# Patient Record
Sex: MALE | Race: WHITE | NOT HISPANIC OR LATINO | URBAN - METROPOLITAN AREA
[De-identification: names, ages, dates, MRNs, and addresses within clinical notes are randomized per-mention and may not be internally consistent; named-entity substitution may affect disease eponyms.]

---

## 2017-02-14 ENCOUNTER — FOLLOW UP ESTABLISHED (OUTPATIENT)
Dept: URBAN - METROPOLITAN AREA CLINIC 44 | Facility: CLINIC | Age: 62
End: 2017-02-14
Payer: COMMERCIAL

## 2017-02-14 PROCEDURE — 92083 EXTENDED VISUAL FIELD XM: CPT | Performed by: OPHTHALMOLOGY

## 2017-02-14 PROCEDURE — 92012 INTRM OPH EXAM EST PATIENT: CPT | Performed by: OPHTHALMOLOGY

## 2017-02-14 ASSESSMENT — INTRAOCULAR PRESSURE
OD: 16
OS: 17

## 2017-03-07 ENCOUNTER — FOLLOW UP ESTABLISHED (OUTPATIENT)
Dept: URBAN - METROPOLITAN AREA CLINIC 44 | Facility: CLINIC | Age: 62
End: 2017-03-07
Payer: COMMERCIAL

## 2017-03-07 PROCEDURE — 92250 FUNDUS PHOTOGRAPHY W/I&R: CPT | Performed by: OPHTHALMOLOGY

## 2017-03-07 PROCEDURE — 92012 INTRM OPH EXAM EST PATIENT: CPT | Performed by: OPHTHALMOLOGY

## 2017-03-07 RX ORDER — ACETAZOLAMIDE 250 MG/1
250 MG TABLET ORAL
Qty: 60 | Refills: 0 | Status: INACTIVE
Start: 2017-03-07 | End: 2018-11-01

## 2017-03-07 RX ORDER — ACETAZOLAMIDE 250 MG/1
250 MG TABLET ORAL
Qty: 60 | Refills: 0 | Status: INACTIVE
Start: 2017-03-07 | End: 2017-03-07

## 2017-03-07 ASSESSMENT — INTRAOCULAR PRESSURE
OS: 38
OD: 30

## 2017-03-14 ENCOUNTER — FOLLOW UP ESTABLISHED (OUTPATIENT)
Dept: URBAN - METROPOLITAN AREA CLINIC 44 | Facility: CLINIC | Age: 62
End: 2017-03-14
Payer: COMMERCIAL

## 2017-03-14 PROCEDURE — 92012 INTRM OPH EXAM EST PATIENT: CPT | Performed by: OPHTHALMOLOGY

## 2017-03-14 ASSESSMENT — INTRAOCULAR PRESSURE
OD: 20
OS: 20

## 2017-03-24 ENCOUNTER — FOLLOW UP ESTABLISHED (OUTPATIENT)
Dept: URBAN - METROPOLITAN AREA CLINIC 44 | Facility: CLINIC | Age: 62
End: 2017-03-24
Payer: COMMERCIAL

## 2017-03-24 DIAGNOSIS — H40.1112 PRIMARY OPEN-ANGLE GLAUCOMA, MODERATE STAGE, RIGHT EYE: Primary | ICD-10-CM

## 2017-03-24 PROCEDURE — 92020 GONIOSCOPY: CPT | Performed by: OPHTHALMOLOGY

## 2017-03-24 PROCEDURE — 92014 COMPRE OPH EXAM EST PT 1/>: CPT | Performed by: OPHTHALMOLOGY

## 2017-03-24 RX ORDER — TAFLUPROST 0 MG/.3ML
0.0015 % SOLUTION/ DROPS OPHTHALMIC
Qty: 30 | Refills: 3 | Status: INACTIVE
Start: 2017-03-24 | End: 2017-11-21

## 2017-03-24 RX ORDER — METHAZOLAMIDE 50 MG/1
50 MG TABLET ORAL
Qty: 90 | Refills: 3 | Status: INACTIVE
Start: 2017-03-24 | End: 2017-03-27

## 2017-03-24 ASSESSMENT — INTRAOCULAR PRESSURE
OD: 28
OS: 30
OD: 26
OS: 27

## 2017-04-14 ENCOUNTER — FOLLOW UP ESTABLISHED (OUTPATIENT)
Dept: URBAN - METROPOLITAN AREA CLINIC 51 | Facility: CLINIC | Age: 62
End: 2017-04-14
Payer: COMMERCIAL

## 2017-04-14 DIAGNOSIS — H40.051 OCULAR HYPERTENSION, RIGHT EYE: Primary | ICD-10-CM

## 2017-04-14 PROCEDURE — 92014 COMPRE OPH EXAM EST PT 1/>: CPT | Performed by: OPHTHALMOLOGY

## 2017-04-14 ASSESSMENT — INTRAOCULAR PRESSURE
OD: 20
OS: 23

## 2017-05-24 ENCOUNTER — FOLLOW UP ESTABLISHED (OUTPATIENT)
Dept: URBAN - METROPOLITAN AREA CLINIC 51 | Facility: CLINIC | Age: 62
End: 2017-05-24
Payer: COMMERCIAL

## 2017-05-24 PROCEDURE — 92012 INTRM OPH EXAM EST PATIENT: CPT | Performed by: OPHTHALMOLOGY

## 2017-05-24 PROCEDURE — 92083 EXTENDED VISUAL FIELD XM: CPT | Performed by: OPHTHALMOLOGY

## 2017-05-24 ASSESSMENT — INTRAOCULAR PRESSURE
OS: 14
OD: 13

## 2017-08-23 ENCOUNTER — FOLLOW UP ESTABLISHED (OUTPATIENT)
Dept: URBAN - METROPOLITAN AREA CLINIC 51 | Facility: CLINIC | Age: 62
End: 2017-08-23
Payer: COMMERCIAL

## 2017-08-23 PROCEDURE — 92012 INTRM OPH EXAM EST PATIENT: CPT | Performed by: OPHTHALMOLOGY

## 2017-08-23 ASSESSMENT — INTRAOCULAR PRESSURE
OD: 23
OS: 31
OS: 13
OD: 14

## 2018-02-16 ENCOUNTER — FOLLOW UP ESTABLISHED (OUTPATIENT)
Dept: URBAN - METROPOLITAN AREA CLINIC 51 | Facility: CLINIC | Age: 63
End: 2018-02-16
Payer: COMMERCIAL

## 2018-02-16 DIAGNOSIS — H40.1121 PRIMARY OPEN-ANGLE GLAUCOMA, LEFT EYE, MILD STAGE: ICD-10-CM

## 2018-02-16 PROCEDURE — 92014 COMPRE OPH EXAM EST PT 1/>: CPT | Performed by: OPHTHALMOLOGY

## 2018-02-16 RX ORDER — BRIMONIDINE TARTRATE 1 MG/ML
0.1 % SOLUTION/ DROPS OPHTHALMIC
Qty: 3 | Refills: 1 | Status: INACTIVE
Start: 2018-02-16 | End: 2018-04-06

## 2018-02-16 RX ORDER — TAFLUPROST 0 MG/.3ML
0.0015 % SOLUTION/ DROPS OPHTHALMIC
Qty: 90 | Refills: 1 | Status: INACTIVE
Start: 2018-02-16 | End: 2018-07-30

## 2018-02-16 ASSESSMENT — INTRAOCULAR PRESSURE
OS: 38
OD: 24

## 2018-03-05 ENCOUNTER — FOLLOW UP ESTABLISHED (OUTPATIENT)
Dept: URBAN - METROPOLITAN AREA CLINIC 51 | Facility: CLINIC | Age: 63
End: 2018-03-05
Payer: COMMERCIAL

## 2018-03-05 PROCEDURE — 92012 INTRM OPH EXAM EST PATIENT: CPT | Performed by: OPHTHALMOLOGY

## 2018-03-05 PROCEDURE — 92083 EXTENDED VISUAL FIELD XM: CPT | Performed by: OPHTHALMOLOGY

## 2018-03-05 ASSESSMENT — INTRAOCULAR PRESSURE
OD: 15
OS: 18

## 2018-06-29 ENCOUNTER — FOLLOW UP ESTABLISHED (OUTPATIENT)
Dept: URBAN - METROPOLITAN AREA CLINIC 44 | Facility: CLINIC | Age: 63
End: 2018-06-29
Payer: COMMERCIAL

## 2018-06-29 PROCEDURE — 92133 CPTRZD OPH DX IMG PST SGM ON: CPT | Performed by: OPHTHALMOLOGY

## 2018-06-29 PROCEDURE — 92012 INTRM OPH EXAM EST PATIENT: CPT | Performed by: OPHTHALMOLOGY

## 2018-06-29 ASSESSMENT — INTRAOCULAR PRESSURE
OD: 15
OS: 17

## 2018-09-24 ENCOUNTER — FOLLOW UP ESTABLISHED (OUTPATIENT)
Dept: URBAN - METROPOLITAN AREA CLINIC 44 | Facility: CLINIC | Age: 63
End: 2018-09-24
Payer: COMMERCIAL

## 2018-09-24 PROCEDURE — 92014 COMPRE OPH EXAM EST PT 1/>: CPT | Performed by: OPHTHALMOLOGY

## 2018-09-24 ASSESSMENT — INTRAOCULAR PRESSURE
OS: 18
OD: 16

## 2018-10-15 ENCOUNTER — FOLLOW UP ESTABLISHED (OUTPATIENT)
Dept: URBAN - METROPOLITAN AREA CLINIC 51 | Facility: CLINIC | Age: 63
End: 2018-10-15
Payer: COMMERCIAL

## 2018-10-15 PROCEDURE — 92012 INTRM OPH EXAM EST PATIENT: CPT | Performed by: OPHTHALMOLOGY

## 2018-10-15 ASSESSMENT — INTRAOCULAR PRESSURE
OS: 20
OD: 18

## 2018-11-01 ENCOUNTER — FOLLOW UP ESTABLISHED (OUTPATIENT)
Dept: URBAN - METROPOLITAN AREA CLINIC 51 | Facility: CLINIC | Age: 63
End: 2018-11-01
Payer: COMMERCIAL

## 2018-11-01 DIAGNOSIS — H40.1131 PRIMARY OPEN-ANGLE GLAUCOMA, MILD STAGE, BILATERAL: ICD-10-CM

## 2018-11-01 DIAGNOSIS — H40.1134 PRIMARY OPEN-ANGLE GLAUCOMA, BILATERAL, INDETERMINATE STAGE: ICD-10-CM

## 2018-11-01 DIAGNOSIS — H33.101 UNSPECIFIED RETINOSCHISIS, RIGHT EYE: ICD-10-CM

## 2018-11-01 PROCEDURE — 92014 COMPRE OPH EXAM EST PT 1/>: CPT | Performed by: OPTOMETRIST

## 2018-11-01 RX ORDER — NEOMYCIN SULFATE, POLYMYXIN B SULFATE AND DEXAMETHASONE 3.5; 10000; 1 MG/G; [USP'U]/G; MG/G
OINTMENT OPHTHALMIC
Qty: 1 | Refills: 1 | Status: INACTIVE
Start: 2018-11-01 | End: 2018-12-03

## 2018-11-01 ASSESSMENT — INTRAOCULAR PRESSURE
OD: 18
OS: 19

## 2018-11-01 ASSESSMENT — KERATOMETRY
OD: 45.84
OS: 45.59

## 2018-11-01 ASSESSMENT — VISUAL ACUITY
OS: 20/20
OD: 20/20

## 2018-12-06 ENCOUNTER — FOLLOW UP ESTABLISHED (OUTPATIENT)
Dept: URBAN - METROPOLITAN AREA CLINIC 51 | Facility: CLINIC | Age: 63
End: 2018-12-06
Payer: COMMERCIAL

## 2018-12-06 PROCEDURE — 92012 INTRM OPH EXAM EST PATIENT: CPT | Performed by: OPTOMETRIST

## 2018-12-06 ASSESSMENT — INTRAOCULAR PRESSURE
OD: 28
OS: 58
OS: 60
OD: 26

## 2018-12-07 ENCOUNTER — FOLLOW UP ESTABLISHED (OUTPATIENT)
Dept: URBAN - METROPOLITAN AREA CLINIC 51 | Facility: CLINIC | Age: 63
End: 2018-12-07
Payer: COMMERCIAL

## 2018-12-07 PROCEDURE — 92012 INTRM OPH EXAM EST PATIENT: CPT | Performed by: OPTOMETRIST

## 2018-12-07 ASSESSMENT — INTRAOCULAR PRESSURE
OD: 16
OS: 22

## 2019-01-07 ENCOUNTER — FOLLOW UP ESTABLISHED (OUTPATIENT)
Dept: URBAN - METROPOLITAN AREA CLINIC 51 | Facility: CLINIC | Age: 64
End: 2019-01-07
Payer: COMMERCIAL

## 2019-01-07 PROCEDURE — 92012 INTRM OPH EXAM EST PATIENT: CPT | Performed by: OPHTHALMOLOGY

## 2019-01-07 ASSESSMENT — INTRAOCULAR PRESSURE
OD: 18
OS: 19

## 2019-04-15 ENCOUNTER — FOLLOW UP ESTABLISHED (OUTPATIENT)
Dept: URBAN - METROPOLITAN AREA CLINIC 51 | Facility: CLINIC | Age: 64
End: 2019-04-15
Payer: COMMERCIAL

## 2019-04-15 DIAGNOSIS — H01.115 ALLERGIC DERMATITIS OF LEFT LOWER EYELID: ICD-10-CM

## 2019-04-15 PROCEDURE — 92012 INTRM OPH EXAM EST PATIENT: CPT | Performed by: OPHTHALMOLOGY

## 2019-04-15 PROCEDURE — 92083 EXTENDED VISUAL FIELD XM: CPT | Performed by: OPHTHALMOLOGY

## 2019-04-15 RX ORDER — NETARSUDIL 0.2 MG/ML
0.02 % SOLUTION/ DROPS OPHTHALMIC; TOPICAL
Qty: 3 | Refills: 1 | Status: INACTIVE
Start: 2019-04-15 | End: 2019-06-03

## 2019-04-15 ASSESSMENT — INTRAOCULAR PRESSURE
OS: 27
OD: 22

## 2019-06-03 ENCOUNTER — FOLLOW UP ESTABLISHED (OUTPATIENT)
Dept: URBAN - METROPOLITAN AREA CLINIC 44 | Facility: CLINIC | Age: 64
End: 2019-06-03
Payer: COMMERCIAL

## 2019-06-03 DIAGNOSIS — H04.123 DRY EYE SYNDROME OF BILATERAL LACRIMAL GLANDS: ICD-10-CM

## 2019-06-03 PROCEDURE — 92133 CPTRZD OPH DX IMG PST SGM ON: CPT | Performed by: OPHTHALMOLOGY

## 2019-06-03 PROCEDURE — 92012 INTRM OPH EXAM EST PATIENT: CPT | Performed by: OPHTHALMOLOGY

## 2019-06-03 RX ORDER — TAFLUPROST 0 MG/.3ML
0.0015 % SOLUTION/ DROPS OPHTHALMIC
Qty: 90 | Refills: 3 | Status: INACTIVE
Start: 2019-06-03 | End: 2020-01-28

## 2019-06-03 RX ORDER — NETARSUDIL 0.2 MG/ML
0.02 % SOLUTION/ DROPS OPHTHALMIC; TOPICAL
Qty: 3 | Refills: 1 | Status: INACTIVE
Start: 2019-06-03 | End: 2020-01-28

## 2019-06-03 ASSESSMENT — INTRAOCULAR PRESSURE
OD: 17
OS: 18

## 2019-08-26 ENCOUNTER — FOLLOW UP ESTABLISHED (OUTPATIENT)
Dept: URBAN - METROPOLITAN AREA CLINIC 51 | Facility: CLINIC | Age: 64
End: 2019-08-26
Payer: COMMERCIAL

## 2019-08-26 PROCEDURE — 92014 COMPRE OPH EXAM EST PT 1/>: CPT | Performed by: OPTOMETRIST

## 2019-08-26 ASSESSMENT — INTRAOCULAR PRESSURE
OS: 36
OS: 40
OD: 28
OD: 24
OS: 38

## 2019-08-27 ENCOUNTER — FOLLOW UP ESTABLISHED (OUTPATIENT)
Dept: URBAN - METROPOLITAN AREA CLINIC 51 | Facility: CLINIC | Age: 64
End: 2019-08-27
Payer: COMMERCIAL

## 2019-08-27 DIAGNOSIS — H11.31 CONJUNCTIVAL HEMORRHAGE, RIGHT EYE: ICD-10-CM

## 2019-08-27 PROCEDURE — 92012 INTRM OPH EXAM EST PATIENT: CPT | Performed by: OPTOMETRIST

## 2019-08-27 PROCEDURE — 92083 EXTENDED VISUAL FIELD XM: CPT | Performed by: OPTOMETRIST

## 2019-08-27 ASSESSMENT — INTRAOCULAR PRESSURE
OS: 30
OD: 20

## 2019-08-30 ENCOUNTER — FOLLOW UP ESTABLISHED (OUTPATIENT)
Dept: URBAN - METROPOLITAN AREA CLINIC 51 | Facility: CLINIC | Age: 64
End: 2019-08-30
Payer: COMMERCIAL

## 2019-08-30 PROCEDURE — 92012 INTRM OPH EXAM EST PATIENT: CPT | Performed by: OPHTHALMOLOGY

## 2019-08-30 PROCEDURE — 92133 CPTRZD OPH DX IMG PST SGM ON: CPT | Performed by: OPHTHALMOLOGY

## 2019-08-30 RX ORDER — DORZOLAMIDE HCL 20 MG/ML
2 % SOLUTION/ DROPS OPHTHALMIC
Qty: 1 | Refills: 1 | Status: INACTIVE
Start: 2019-08-30 | End: 2019-11-19

## 2019-08-30 ASSESSMENT — INTRAOCULAR PRESSURE
OS: 26
OD: 20

## 2019-09-20 ENCOUNTER — FOLLOW UP ESTABLISHED (OUTPATIENT)
Dept: URBAN - METROPOLITAN AREA CLINIC 51 | Facility: CLINIC | Age: 64
End: 2019-09-20
Payer: COMMERCIAL

## 2019-09-20 PROCEDURE — 76514 ECHO EXAM OF EYE THICKNESS: CPT | Performed by: OPHTHALMOLOGY

## 2019-09-20 PROCEDURE — 92133 CPTRZD OPH DX IMG PST SGM ON: CPT | Performed by: OPHTHALMOLOGY

## 2019-09-20 PROCEDURE — 92083 EXTENDED VISUAL FIELD XM: CPT | Performed by: OPHTHALMOLOGY

## 2019-09-20 PROCEDURE — 92014 COMPRE OPH EXAM EST PT 1/>: CPT | Performed by: OPHTHALMOLOGY

## 2019-09-20 PROCEDURE — 92020 GONIOSCOPY: CPT | Performed by: OPHTHALMOLOGY

## 2019-09-20 RX ORDER — TIMOLOL MALEATE 5 MG/ML
0.5 % SOLUTION/ DROPS OPHTHALMIC
Qty: 1 | Refills: 5 | Status: INACTIVE
Start: 2019-09-20 | End: 2019-11-19

## 2019-09-20 ASSESSMENT — INTRAOCULAR PRESSURE
OS: 26
OD: 23

## 2019-09-20 ASSESSMENT — VISUAL ACUITY
OS: 20/25
OD: 20/20

## 2019-10-18 ENCOUNTER — FOLLOW UP ESTABLISHED (OUTPATIENT)
Dept: URBAN - METROPOLITAN AREA CLINIC 51 | Facility: CLINIC | Age: 64
End: 2019-10-18
Payer: COMMERCIAL

## 2019-10-18 DIAGNOSIS — H40.1123 PRIMARY OPEN-ANGLE GLAUCOMA, LEFT EYE, SEVERE STAGE: Primary | ICD-10-CM

## 2019-10-18 PROCEDURE — 92012 INTRM OPH EXAM EST PATIENT: CPT | Performed by: OPHTHALMOLOGY

## 2019-10-18 PROCEDURE — 92083 EXTENDED VISUAL FIELD XM: CPT | Performed by: OPHTHALMOLOGY

## 2019-10-18 RX ORDER — DORZOLAMIDE HYDROCHLORIDE AND TIMOLOL MALEATE 20; 5 MG/ML; MG/ML
SOLUTION/ DROPS OPHTHALMIC
Qty: 360 | Refills: 1 | Status: INACTIVE
Start: 2019-10-18 | End: 2019-11-19

## 2019-10-18 ASSESSMENT — INTRAOCULAR PRESSURE
OS: 20
OD: 20

## 2019-10-31 ENCOUNTER — Encounter (OUTPATIENT)
Dept: URBAN - METROPOLITAN AREA CLINIC 51 | Facility: CLINIC | Age: 64
End: 2019-10-31
Payer: COMMERCIAL

## 2019-10-31 DIAGNOSIS — Z01.818 ENCOUNTER FOR OTHER PREPROCEDURAL EXAMINATION: Primary | ICD-10-CM

## 2019-10-31 PROCEDURE — 99213 OFFICE O/P EST LOW 20 MIN: CPT | Performed by: PHYSICIAN ASSISTANT

## 2019-11-11 ENCOUNTER — SURGERY (OUTPATIENT)
Dept: URBAN - METROPOLITAN AREA SURGERY 19 | Facility: SURGERY | Age: 64
End: 2019-11-11
Payer: COMMERCIAL

## 2019-11-11 PROCEDURE — 66710 CILIARY TRANSSLERAL THERAPY: CPT | Performed by: OPHTHALMOLOGY

## 2019-11-12 ENCOUNTER — POST OP (OUTPATIENT)
Dept: URBAN - METROPOLITAN AREA CLINIC 10 | Facility: CLINIC | Age: 64
End: 2019-11-12

## 2019-11-12 PROCEDURE — 99024 POSTOP FOLLOW-UP VISIT: CPT | Performed by: OPHTHALMOLOGY

## 2019-11-12 ASSESSMENT — INTRAOCULAR PRESSURE
OS: 24
OD: 20

## 2019-11-19 ENCOUNTER — POST OP (OUTPATIENT)
Dept: URBAN - METROPOLITAN AREA CLINIC 44 | Facility: CLINIC | Age: 64
End: 2019-11-19

## 2019-11-19 PROCEDURE — 99024 POSTOP FOLLOW-UP VISIT: CPT | Performed by: OPHTHALMOLOGY

## 2019-11-19 RX ORDER — DORZOLAMIDE HYDROCHLORIDE AND TIMOLOL MALEATE 20; 5 MG/ML; MG/ML
SOLUTION/ DROPS OPHTHALMIC
Qty: 360 | Refills: 1 | Status: INACTIVE
Start: 2019-11-19 | End: 2020-01-28

## 2019-11-19 ASSESSMENT — INTRAOCULAR PRESSURE
OS: 24
OD: 18

## 2019-12-13 ENCOUNTER — POST OP (OUTPATIENT)
Dept: URBAN - METROPOLITAN AREA CLINIC 51 | Facility: CLINIC | Age: 64
End: 2019-12-13
Payer: COMMERCIAL

## 2019-12-13 DIAGNOSIS — Z48.89 ENCOUNTER FOR OTHER SPECIFIED SURGICAL AFTERCARE: Primary | ICD-10-CM

## 2019-12-13 PROCEDURE — 99024 POSTOP FOLLOW-UP VISIT: CPT | Performed by: OPHTHALMOLOGY

## 2019-12-13 RX ORDER — FLUOROMETHOLONE 1 MG/ML
0.1 % SUSPENSION/ DROPS OPHTHALMIC
Qty: 1 | Refills: 0 | Status: INACTIVE
Start: 2019-12-13 | End: 2020-01-28

## 2019-12-13 RX ORDER — DUREZOL 0.5 MG/ML
0.05 % EMULSION OPHTHALMIC
Qty: 5 | Refills: 0 | Status: INACTIVE
Start: 2019-12-13 | End: 2020-02-10

## 2019-12-13 RX ORDER — OFLOXACIN 3 MG/ML
0.3 % SOLUTION/ DROPS OPHTHALMIC
Qty: 1 | Refills: 1 | Status: INACTIVE
Start: 2019-12-13 | End: 2019-12-30

## 2019-12-13 ASSESSMENT — INTRAOCULAR PRESSURE
OD: 18
OS: 25

## 2020-01-13 ENCOUNTER — Encounter (OUTPATIENT)
Dept: URBAN - METROPOLITAN AREA CLINIC 44 | Facility: CLINIC | Age: 65
End: 2020-01-13
Payer: COMMERCIAL

## 2020-01-13 PROCEDURE — 99213 OFFICE O/P EST LOW 20 MIN: CPT | Performed by: PHYSICIAN ASSISTANT

## 2020-01-20 ENCOUNTER — SURGERY (OUTPATIENT)
Dept: URBAN - METROPOLITAN AREA SURGERY 19 | Facility: SURGERY | Age: 65
End: 2020-01-20
Payer: COMMERCIAL

## 2020-01-20 PROCEDURE — 66170 GLAUCOMA SURGERY: CPT | Performed by: OPHTHALMOLOGY

## 2020-01-21 ENCOUNTER — POST OP (OUTPATIENT)
Dept: URBAN - METROPOLITAN AREA CLINIC 10 | Facility: CLINIC | Age: 65
End: 2020-01-21

## 2020-01-21 PROCEDURE — 99024 POSTOP FOLLOW-UP VISIT: CPT | Performed by: OPHTHALMOLOGY

## 2020-01-21 ASSESSMENT — INTRAOCULAR PRESSURE
OS: 22
OD: 25

## 2020-01-28 ENCOUNTER — FOLLOW UP ESTABLISHED (OUTPATIENT)
Dept: URBAN - METROPOLITAN AREA CLINIC 44 | Facility: CLINIC | Age: 65
End: 2020-01-28

## 2020-01-28 PROCEDURE — 99024 POSTOP FOLLOW-UP VISIT: CPT | Performed by: OPHTHALMOLOGY

## 2020-01-28 RX ORDER — TAFLUPROST 0 MG/.3ML
0.0015 % SOLUTION/ DROPS OPHTHALMIC
Qty: 90 | Refills: 3 | Status: INACTIVE
Start: 2020-01-28 | End: 2020-05-18

## 2020-01-28 RX ORDER — DORZOLAMIDE HYDROCHLORIDE AND TIMOLOL MALEATE 20; 5 MG/ML; MG/ML
SOLUTION/ DROPS OPHTHALMIC
Qty: 360 | Refills: 1 | Status: INACTIVE
Start: 2020-01-28 | End: 2020-04-16

## 2020-01-28 RX ORDER — NETARSUDIL 0.2 MG/ML
0.02 % SOLUTION/ DROPS OPHTHALMIC; TOPICAL
Qty: 3 | Refills: 1 | Status: INACTIVE
Start: 2020-01-28 | End: 2020-05-18

## 2020-01-28 ASSESSMENT — INTRAOCULAR PRESSURE
OD: 24
OS: 20
OS: 18

## 2020-02-03 ENCOUNTER — POST OP (OUTPATIENT)
Dept: URBAN - METROPOLITAN AREA CLINIC 44 | Facility: CLINIC | Age: 65
End: 2020-02-03

## 2020-02-03 PROCEDURE — 99024 POSTOP FOLLOW-UP VISIT: CPT | Performed by: OPHTHALMOLOGY

## 2020-02-03 ASSESSMENT — INTRAOCULAR PRESSURE
OD: 18
OS: 21

## 2020-02-07 ENCOUNTER — FOLLOW UP ESTABLISHED (OUTPATIENT)
Dept: URBAN - METROPOLITAN AREA CLINIC 51 | Facility: CLINIC | Age: 65
End: 2020-02-07
Payer: COMMERCIAL

## 2020-02-07 PROCEDURE — 99024 POSTOP FOLLOW-UP VISIT: CPT | Performed by: OPHTHALMOLOGY

## 2020-02-07 ASSESSMENT — VISUAL ACUITY: OS: 20/30

## 2020-02-07 ASSESSMENT — INTRAOCULAR PRESSURE
OD: 19
OS: 3

## 2020-02-10 ENCOUNTER — FOLLOW UP ESTABLISHED (OUTPATIENT)
Dept: URBAN - METROPOLITAN AREA CLINIC 56 | Facility: CLINIC | Age: 65
End: 2020-02-10

## 2020-02-10 PROCEDURE — 99024 POSTOP FOLLOW-UP VISIT: CPT | Performed by: OPHTHALMOLOGY

## 2020-02-10 ASSESSMENT — INTRAOCULAR PRESSURE
OD: 20
OS: 3

## 2020-02-17 ENCOUNTER — POST OP (OUTPATIENT)
Dept: URBAN - METROPOLITAN AREA CLINIC 44 | Facility: CLINIC | Age: 65
End: 2020-02-17

## 2020-02-17 PROCEDURE — 99024 POSTOP FOLLOW-UP VISIT: CPT | Performed by: OPHTHALMOLOGY

## 2020-02-17 ASSESSMENT — INTRAOCULAR PRESSURE
OS: 6
OD: 21

## 2020-02-25 ENCOUNTER — FOLLOW UP ESTABLISHED (OUTPATIENT)
Dept: URBAN - METROPOLITAN AREA CLINIC 44 | Facility: CLINIC | Age: 65
End: 2020-02-25

## 2020-02-25 PROCEDURE — 99024 POSTOP FOLLOW-UP VISIT: CPT | Performed by: OPHTHALMOLOGY

## 2020-02-25 ASSESSMENT — INTRAOCULAR PRESSURE
OS: 6
OD: 16

## 2020-03-06 ENCOUNTER — FOLLOW UP ESTABLISHED (OUTPATIENT)
Dept: URBAN - METROPOLITAN AREA CLINIC 51 | Facility: CLINIC | Age: 65
End: 2020-03-06

## 2020-03-06 PROCEDURE — 99024 POSTOP FOLLOW-UP VISIT: CPT | Performed by: OPHTHALMOLOGY

## 2020-03-06 ASSESSMENT — INTRAOCULAR PRESSURE
OD: 19
OS: 9

## 2020-03-12 ENCOUNTER — FOLLOW UP ESTABLISHED (OUTPATIENT)
Dept: URBAN - METROPOLITAN AREA CLINIC 44 | Facility: CLINIC | Age: 65
End: 2020-03-12

## 2020-03-12 DIAGNOSIS — Z98.83 FILTERING (VITREOUS) BLEB AFTER GLAUCOMA SURGERY STATUS: Primary | ICD-10-CM

## 2020-03-12 PROCEDURE — 99024 POSTOP FOLLOW-UP VISIT: CPT | Performed by: OPHTHALMOLOGY

## 2020-03-12 RX ORDER — DUREZOL 0.5 MG/ML
0.05 % EMULSION OPHTHALMIC
Qty: 5 | Refills: 1 | Status: INACTIVE
Start: 2020-03-12 | End: 2020-05-18

## 2020-03-12 ASSESSMENT — INTRAOCULAR PRESSURE
OD: 20
OS: 13

## 2020-03-19 ENCOUNTER — FOLLOW UP ESTABLISHED (OUTPATIENT)
Dept: URBAN - METROPOLITAN AREA CLINIC 44 | Facility: CLINIC | Age: 65
End: 2020-03-19

## 2020-03-19 PROCEDURE — 99024 POSTOP FOLLOW-UP VISIT: CPT | Performed by: OPHTHALMOLOGY

## 2020-03-19 ASSESSMENT — INTRAOCULAR PRESSURE
OD: 20
OS: 13

## 2020-04-06 ENCOUNTER — POST OP (OUTPATIENT)
Dept: URBAN - METROPOLITAN AREA CLINIC 44 | Facility: CLINIC | Age: 65
End: 2020-04-06

## 2020-04-06 PROCEDURE — 99024 POSTOP FOLLOW-UP VISIT: CPT | Performed by: OPHTHALMOLOGY

## 2020-04-06 ASSESSMENT — INTRAOCULAR PRESSURE
OS: 13
OD: 19

## 2020-04-20 ENCOUNTER — FOLLOW UP ESTABLISHED (OUTPATIENT)
Dept: URBAN - METROPOLITAN AREA CLINIC 44 | Facility: CLINIC | Age: 65
End: 2020-04-20

## 2020-04-20 PROCEDURE — 99024 POSTOP FOLLOW-UP VISIT: CPT | Performed by: OPHTHALMOLOGY

## 2020-04-20 ASSESSMENT — INTRAOCULAR PRESSURE
OD: 18
OS: 11

## 2020-05-18 ENCOUNTER — FOLLOW UP ESTABLISHED (OUTPATIENT)
Dept: URBAN - METROPOLITAN AREA CLINIC 44 | Facility: CLINIC | Age: 65
End: 2020-05-18
Payer: COMMERCIAL

## 2020-05-18 PROCEDURE — 92012 INTRM OPH EXAM EST PATIENT: CPT | Performed by: OPHTHALMOLOGY

## 2020-05-18 RX ORDER — TAFLUPROST 0 MG/.3ML
0.0015 % SOLUTION/ DROPS OPHTHALMIC
Qty: 90 | Refills: 3 | Status: INACTIVE
Start: 2020-05-18 | End: 2021-02-15

## 2020-05-18 RX ORDER — NETARSUDIL 0.2 MG/ML
0.02 % SOLUTION/ DROPS OPHTHALMIC; TOPICAL
Qty: 3 | Refills: 1 | Status: INACTIVE
Start: 2020-05-18 | End: 2021-10-19

## 2020-05-18 RX ORDER — DORZOLAMIDE HYDROCHLORIDE AND TIMOLOL MALEATE 20; 5 MG/ML; MG/ML
SOLUTION/ DROPS OPHTHALMIC
Qty: 360 | Refills: 1 | Status: INACTIVE
Start: 2020-05-18 | End: 2020-09-11

## 2020-05-18 ASSESSMENT — INTRAOCULAR PRESSURE
OS: 11
OD: 16

## 2020-06-22 ENCOUNTER — FOLLOW UP ESTABLISHED (OUTPATIENT)
Dept: URBAN - METROPOLITAN AREA CLINIC 51 | Facility: CLINIC | Age: 65
End: 2020-06-22
Payer: COMMERCIAL

## 2020-06-22 PROCEDURE — 92012 INTRM OPH EXAM EST PATIENT: CPT | Performed by: OPHTHALMOLOGY

## 2020-06-22 ASSESSMENT — INTRAOCULAR PRESSURE
OD: 15
OS: 11

## 2020-07-10 ENCOUNTER — FOLLOW UP ESTABLISHED (OUTPATIENT)
Dept: URBAN - METROPOLITAN AREA CLINIC 51 | Facility: CLINIC | Age: 65
End: 2020-07-10
Payer: COMMERCIAL

## 2020-07-10 DIAGNOSIS — H40.1111 PRIMARY OPEN-ANGLE GLAUCOMA, MILD STAGE, RIGHT EYE: ICD-10-CM

## 2020-07-10 PROCEDURE — 92012 INTRM OPH EXAM EST PATIENT: CPT | Performed by: OPHTHALMOLOGY

## 2020-07-10 ASSESSMENT — INTRAOCULAR PRESSURE
OD: 17
OS: 11

## 2020-09-11 ENCOUNTER — FOLLOW UP ESTABLISHED (OUTPATIENT)
Dept: URBAN - METROPOLITAN AREA CLINIC 51 | Facility: CLINIC | Age: 65
End: 2020-09-11
Payer: COMMERCIAL

## 2020-09-11 PROCEDURE — 92133 CPTRZD OPH DX IMG PST SGM ON: CPT | Performed by: OPHTHALMOLOGY

## 2020-09-11 PROCEDURE — 92083 EXTENDED VISUAL FIELD XM: CPT | Performed by: OPHTHALMOLOGY

## 2020-09-11 PROCEDURE — 92014 COMPRE OPH EXAM EST PT 1/>: CPT | Performed by: OPHTHALMOLOGY

## 2020-09-11 RX ORDER — DORZOLAMIDE HYDROCHLORIDE AND TIMOLOL MALEATE 20; 5 MG/ML; MG/ML
SOLUTION/ DROPS OPHTHALMIC
Qty: 360 | Refills: 1 | Status: INACTIVE
Start: 2020-09-11 | End: 2021-01-08

## 2020-09-11 ASSESSMENT — INTRAOCULAR PRESSURE
OS: 11
OD: 18

## 2021-01-08 ENCOUNTER — FOLLOW UP ESTABLISHED (OUTPATIENT)
Dept: URBAN - METROPOLITAN AREA CLINIC 51 | Facility: CLINIC | Age: 66
End: 2021-01-08
Payer: COMMERCIAL

## 2021-01-08 PROCEDURE — 92083 EXTENDED VISUAL FIELD XM: CPT | Performed by: OPHTHALMOLOGY

## 2021-01-08 PROCEDURE — 92012 INTRM OPH EXAM EST PATIENT: CPT | Performed by: OPHTHALMOLOGY

## 2021-01-08 RX ORDER — DORZOLAMIDE HYDROCHLORIDE AND TIMOLOL MALEATE 20; 5 MG/ML; MG/ML
SOLUTION/ DROPS OPHTHALMIC
Qty: 360 | Refills: 1 | Status: INACTIVE
Start: 2021-01-08 | End: 2021-11-22

## 2021-01-08 ASSESSMENT — INTRAOCULAR PRESSURE
OS: 9
OD: 18

## 2021-06-11 ENCOUNTER — OFFICE VISIT (OUTPATIENT)
Dept: URBAN - METROPOLITAN AREA CLINIC 51 | Facility: CLINIC | Age: 66
End: 2021-06-11
Payer: COMMERCIAL

## 2021-06-11 DIAGNOSIS — Z96.1 PRESENCE OF INTRAOCULAR LENS: ICD-10-CM

## 2021-06-11 PROCEDURE — 99212 OFFICE O/P EST SF 10 MIN: CPT | Performed by: OPHTHALMOLOGY

## 2021-06-11 ASSESSMENT — INTRAOCULAR PRESSURE
OS: 10
OD: 19

## 2021-06-11 NOTE — IMPRESSION/PLAN
Impression: Presence of intraocular lens: Z96.1. Plan: Recommend glasses for optimal vision; ok to get updated glasses.

## 2021-06-11 NOTE — IMPRESSION/PLAN
Impression: Primary open-angle glaucoma, severe stage, left eye Plan: Condition and IOP are stable today. Per patient, he will be getting Kenalog injections in his back. Discussed steroid side effects and will closely monitor IOP. No changes being made to current treatment at this time. Emphasized and explained compliance. Reassured patient of current condition and treatment and discussed risks of glaucoma progression. 
Continue PF Cosopt BID OD, Rhopressa QHS OD and Zioptan QHS OD.

## 2021-09-17 ENCOUNTER — OFFICE VISIT (OUTPATIENT)
Dept: URBAN - METROPOLITAN AREA CLINIC 51 | Facility: CLINIC | Age: 66
End: 2021-09-17
Payer: COMMERCIAL

## 2021-09-17 DIAGNOSIS — H10.45 OTHER CHRONIC ALLERGIC CONJUNCTIVITIS: ICD-10-CM

## 2021-09-17 DIAGNOSIS — H43.813 VITREOUS DEGENERATION, BILATERAL: ICD-10-CM

## 2021-09-17 PROCEDURE — 99213 OFFICE O/P EST LOW 20 MIN: CPT | Performed by: OPHTHALMOLOGY

## 2021-09-17 PROCEDURE — 92133 CPTRZD OPH DX IMG PST SGM ON: CPT | Performed by: OPHTHALMOLOGY

## 2021-09-17 PROCEDURE — 92083 EXTENDED VISUAL FIELD XM: CPT | Performed by: OPHTHALMOLOGY

## 2021-09-17 ASSESSMENT — INTRAOCULAR PRESSURE
OS: 10
OS: 9
OD: 18
OD: 15

## 2021-09-17 NOTE — IMPRESSION/PLAN
Impression: Primary open-angle glaucoma, severe stage, left eye Plan: Condition and IOP continue to hold today. Discussed steroid side effects and will closely monitor IOP. No changes being made to current treatment at this time. Emphasized and explained compliance. Reassured patient of current condition and treatment and discussed risks of glaucoma progression. Continue PF Cosopt BID OD, Rhopressa QHS OD and Zioptan QHS OD. 
RTC in 3-4 months for VF 10-2 and IOP check

## 2021-09-20 NOTE — IMPRESSION/PLAN
Impression: Vitreous degeneration, bilateral: H43.813. Plan: Monitor. Patient to call/come in with any RD symptoms.

## 2021-09-20 NOTE — IMPRESSION/PLAN
Impression: Other chronic allergic conjunctivitis: H10.45. Plan: Papillae present in right eye today. Patient to use AFT.

## 2022-10-22 ENCOUNTER — OFFICE VISIT (OUTPATIENT)
Dept: URBAN - METROPOLITAN AREA CLINIC 51 | Facility: CLINIC | Age: 67
End: 2022-10-22
Payer: COMMERCIAL

## 2022-10-22 DIAGNOSIS — H40.1111 PRIMARY OPEN-ANGLE GLAUCOMA, RIGHT EYE, MILD STAGE: ICD-10-CM

## 2022-10-22 DIAGNOSIS — H33.101 UNSPECIFIED RETINOSCHISIS, RIGHT EYE: ICD-10-CM

## 2022-10-22 DIAGNOSIS — H10.45 OTHER CHRONIC ALLERGIC CONJUNCTIVITIS: ICD-10-CM

## 2022-10-22 DIAGNOSIS — H52.4 PRESBYOPIA: ICD-10-CM

## 2022-10-22 DIAGNOSIS — H43.813 VITREOUS DEGENERATION, BILATERAL: ICD-10-CM

## 2022-10-22 DIAGNOSIS — H01.02B SQUAMOUS BLEPHARITIS LEFT EYE, UPPER AND LOWER EYELIDS: ICD-10-CM

## 2022-10-22 DIAGNOSIS — H40.1123 PRIMARY OPEN-ANGLE GLAUCOMA, LEFT EYE, SEVERE STAGE: Primary | ICD-10-CM

## 2022-10-22 DIAGNOSIS — H01.02A SQUAMOUS BLEPHARITIS RIGHT EYE, UPPER AND LOWER EYELIDS: ICD-10-CM

## 2022-10-22 DIAGNOSIS — Z96.1 PRESENCE OF INTRAOCULAR LENS: ICD-10-CM

## 2022-10-22 PROCEDURE — 99204 OFFICE O/P NEW MOD 45 MIN: CPT | Performed by: OPTOMETRIST

## 2022-10-22 PROCEDURE — 92133 CPTRZD OPH DX IMG PST SGM ON: CPT | Performed by: OPTOMETRIST

## 2022-10-22 RX ORDER — TAFLUPROST 0 MG/.3ML
0.0015 % SOLUTION/ DROPS OPHTHALMIC
Qty: 90 | Refills: 2 | Status: ACTIVE
Start: 2022-10-22

## 2022-10-22 RX ORDER — DORZOLAMIDE HYDROCHLORIDE AND TIMOLOL MALEATE 20; 5 MG/ML; MG/ML
SOLUTION/ DROPS OPHTHALMIC
Qty: 31 | Refills: 1 | Status: ACTIVE
Start: 2022-10-22

## 2022-10-22 RX ORDER — NETARSUDIL 0.2 MG/ML
0.02 % SOLUTION/ DROPS OPHTHALMIC; TOPICAL
Qty: 3 | Refills: 3 | Status: ACTIVE
Start: 2022-10-22

## 2022-10-22 ASSESSMENT — VISUAL ACUITY
OD: 20/20
OS: 20/20

## 2022-10-22 ASSESSMENT — INTRAOCULAR PRESSURE
OS: 9
OD: 18

## 2022-10-22 NOTE — IMPRESSION/PLAN
Impression: Primary open-angle glaucoma, left eye, severe stage: D88.7335. *trab OS, avascular Plan: IOP 9mmHg OS today. RNFL completed and reviewed. Continue without drops s/p SLT and trabeculectomy. RTC 4 months for pressure check and updated HVF 24-2.

## 2022-10-22 NOTE — IMPRESSION/PLAN
Impression: Primary open-angle glaucoma, right eye, mild stage: H40.1111. Plan: IOP 18mmHg OD today. RNFL completed and reviewed. Continue with current drops. Refills sent to patient's pharmacy. RTC 4 months for pressure check and updated HVF  24-2.

## 2022-10-22 NOTE — IMPRESSION/PLAN
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 Plan: See Plan H014. 02A

## 2022-10-22 NOTE — IMPRESSION/PLAN
Impression: Squamous blepharitis right eye, upper and lower eyelids: H01.02A. Plan: Reviewed blepharitis causes symptoms such as: itchy, sore and red eyelids that stick together, crusty or greasy eyelashes, burning, gritty sensation in your eyes, and visible dandruff on your eyelashes. Some causes are a build up of oil and debris on the eyelids and eyelashes, meibomian gland dysfunction, complication of skin conditions such as Seborrhoeic Dermatitis or Rosacea. The most important part of treating and managing blepharitis is to keep the eyelids clean. Following treatment:
1) Applying any beaded microwaveable eye mask which helps to loosen the debris around lash and lid margins. 2) Daily Lid cleansers such as any HypoChlor solution or Tea Tree Oil lid cleansers to lid margins twice daily after dry warm compresses. Some brands are: Optase, Sterilid by [employer], or Avenova are preferred. 3) Preservative free artificial tears at least four times a day to keep eyes hydrated to minimize dryness that can exacerbate the dandruff. 
This is a daily routine

## 2022-10-22 NOTE — IMPRESSION/PLAN
Impression: Vitreous degeneration, bilateral: H43.813. Plan: PVD No vitreous cells, retinal tears/holes, or detachments observed today. Patient to RTC immediately if notice sudden onset or worsening of flashing lights, floaters or a curtain in vision. Monitor PRN.

## 2023-09-01 ENCOUNTER — OFFICE VISIT (OUTPATIENT)
Dept: URBAN - METROPOLITAN AREA CLINIC 51 | Facility: CLINIC | Age: 68
End: 2023-09-01
Payer: COMMERCIAL

## 2023-09-01 DIAGNOSIS — H01.02B SQUAMOUS BLEPHARITIS LEFT EYE, UPPER AND LOWER EYELIDS: ICD-10-CM

## 2023-09-01 DIAGNOSIS — H40.1111 PRIMARY OPEN-ANGLE GLAUCOMA, RIGHT EYE, MILD STAGE: ICD-10-CM

## 2023-09-01 DIAGNOSIS — Z96.1 PRESENCE OF INTRAOCULAR LENS: ICD-10-CM

## 2023-09-01 DIAGNOSIS — H01.02A SQUAMOUS BLEPHARITIS RIGHT EYE, UPPER AND LOWER EYELIDS: ICD-10-CM

## 2023-09-01 DIAGNOSIS — H40.1123 PRIMARY OPEN-ANGLE GLAUCOMA, LEFT EYE, SEVERE STAGE: Primary | ICD-10-CM

## 2023-09-01 DIAGNOSIS — H43.813 VITREOUS DEGENERATION, BILATERAL: ICD-10-CM

## 2023-09-01 DIAGNOSIS — H33.101 UNSPECIFIED RETINOSCHISIS, RIGHT EYE: ICD-10-CM

## 2023-09-01 PROCEDURE — 99214 OFFICE O/P EST MOD 30 MIN: CPT | Performed by: OPTOMETRIST

## 2023-09-01 PROCEDURE — 92133 CPTRZD OPH DX IMG PST SGM ON: CPT | Performed by: OPTOMETRIST

## 2023-09-01 PROCEDURE — 92083 EXTENDED VISUAL FIELD XM: CPT | Performed by: OPTOMETRIST

## 2023-09-01 PROCEDURE — 92134 CPTRZ OPH DX IMG PST SGM RTA: CPT | Performed by: OPTOMETRIST

## 2023-09-01 RX ORDER — DORZOLAMIDE HYDROCHLORIDE AND TIMOLOL MALEATE 20; 5 MG/ML; MG/ML
SOLUTION/ DROPS OPHTHALMIC
Qty: 2 | Refills: 0 | Status: INACTIVE
Start: 2023-09-01 | End: 2023-09-01

## 2023-09-01 RX ORDER — DORZOLAMIDE HYDROCHLORIDE AND TIMOLOL MALEATE PRESERVATIVE FREE 20; 5 MG/ML; MG/ML
SOLUTION/ DROPS OPHTHALMIC
Qty: 120 | Refills: 1 | Status: ACTIVE
Start: 2023-09-01

## 2023-09-01 ASSESSMENT — VISUAL ACUITY
OD: 20/20
OS: 20/20

## 2023-09-01 ASSESSMENT — INTRAOCULAR PRESSURE
OD: 12
OS: 6

## 2023-09-01 ASSESSMENT — KERATOMETRY
OS: 46.38
OD: 45.63

## 2024-02-13 ENCOUNTER — OFFICE VISIT (OUTPATIENT)
Dept: URBAN - METROPOLITAN AREA CLINIC 51 | Facility: CLINIC | Age: 69
End: 2024-02-13
Payer: COMMERCIAL

## 2024-02-13 DIAGNOSIS — H40.1111 PRIMARY OPEN-ANGLE GLAUCOMA, RIGHT EYE, MILD STAGE: ICD-10-CM

## 2024-02-13 DIAGNOSIS — H40.1123 PRIMARY OPEN-ANGLE GLAUCOMA, LEFT EYE, SEVERE STAGE: Primary | ICD-10-CM

## 2024-02-13 PROCEDURE — 99214 OFFICE O/P EST MOD 30 MIN: CPT | Performed by: OPTOMETRIST

## 2024-02-13 RX ORDER — LATANOPROST OPHTHALMIC SOLUTION 0.005% 50 UG/ML
0.005 % SOLUTION/ DROPS TOPICAL
Qty: 30 | Refills: 6 | Status: ACTIVE
Start: 2024-02-13

## 2024-02-13 ASSESSMENT — INTRAOCULAR PRESSURE
OD: 15
OS: 8

## 2025-05-30 ENCOUNTER — OFFICE VISIT (OUTPATIENT)
Dept: URBAN - METROPOLITAN AREA CLINIC 51 | Facility: CLINIC | Age: 70
End: 2025-05-30
Payer: COMMERCIAL

## 2025-05-30 DIAGNOSIS — H40.1123 PRIMARY OPEN-ANGLE GLAUCOMA, LEFT EYE, SEVERE STAGE: Primary | ICD-10-CM

## 2025-05-30 PROCEDURE — 99214 OFFICE O/P EST MOD 30 MIN: CPT | Performed by: OPTOMETRIST

## 2025-05-30 RX ORDER — DORZOLAMIDE HYDROCHLORIDE AND TIMOLOL MALEATE PRESERVATIVE FREE 20; 5 MG/ML; MG/ML
SOLUTION/ DROPS OPHTHALMIC
Qty: 120 | Refills: 1 | Status: ACTIVE
Start: 2025-05-30

## 2025-05-30 RX ORDER — NETARSUDIL 0.2 MG/ML
0.02 % SOLUTION/ DROPS OPHTHALMIC; TOPICAL
Qty: 2.5 | Refills: 2 | Status: ACTIVE
Start: 2025-05-30

## 2025-05-30 RX ORDER — TAFLUPROST 0 MG/.3ML
0.0015 % SOLUTION/ DROPS OPHTHALMIC
Qty: 90 | Refills: 2 | Status: INACTIVE
Start: 2025-05-30 | End: 2025-05-30

## 2025-05-30 RX ORDER — TAFLUPROST 0 MG/.3ML
0.0015 % SOLUTION/ DROPS OPHTHALMIC
Qty: 90 | Refills: 2 | Status: ACTIVE
Start: 2025-05-30

## 2025-05-30 ASSESSMENT — INTRAOCULAR PRESSURE
OD: 16
OS: 11